# Patient Record
Sex: MALE | Race: BLACK OR AFRICAN AMERICAN | NOT HISPANIC OR LATINO | Employment: OTHER | URBAN - METROPOLITAN AREA
[De-identification: names, ages, dates, MRNs, and addresses within clinical notes are randomized per-mention and may not be internally consistent; named-entity substitution may affect disease eponyms.]

---

## 2017-07-07 ENCOUNTER — TELEPHONE (OUTPATIENT)
Dept: MEDICAL GROUP | Age: 67
End: 2017-07-07

## 2017-07-07 NOTE — TELEPHONE ENCOUNTER
Called Vladimir Sullivan and left a message to return my call regarding his/her upcoming NP appointment with Nelly Kitchen M.D..   If patient returns the call please transfer them to extension: [phone]

## 2017-07-10 NOTE — TELEPHONE ENCOUNTER
Called Vladimir Sullivan and left a message to return my call regarding his/her upcoming NP appointment with Nelly Kitchen M.D..   If patient returns the call please transfer them to extension: [phone]

## 2017-07-11 NOTE — TELEPHONE ENCOUNTER
NEW PATIENT VISIT PRE-VISIT PLANNING    1.  [employer] Patient is checked in Patient Demographics? YES    2.  Immunizations were updated in Epic using WebIZ?: No WebIZ record       •  Web Iz Recommendations:     3.  Is this appointment scheduled as a Hospital Follow-Up? No    4.  Patient is due for the following Health Maintenance Topics:   Health Maintenance Due   Topic Date Due   • IMM DTaP/Tdap/Td Vaccine (1 - Tdap) 04/08/1969   • COLONOSCOPY  04/08/2000   • IMM ZOSTER VACCINE  04/08/2010   • IMM PNEUMOCOCCAL 65+ (ADULT) LOW/MEDIUM RISK SERIES (1 of 2 - PCV13) 04/08/2015           5.  Reviewed/Updated the following with patient:       •   Preferred Pharmacy? YES       •   Preferred Lab? YES       •   Medications? YES. Was Abstract Encounter opened and chart updated? YES       •   Social History? YES. Was Abstract Encounter opened and chart updated? YES       •   Family History? YES. Was Abstract Encounter opened and chart updated? YES    6.  Updated Care Team?       •   DME Company (gait device, O2, CPAP, etc.) N\A       •   Other Specialists (eye doctor, derm, GYN, cardiology, endo, etc): N\A    7.  Patient was informed to arrive 15 min prior to their scheduled appointment and bring in their medication bottles.

## 2017-07-14 ENCOUNTER — OFFICE VISIT (OUTPATIENT)
Dept: MEDICAL GROUP | Age: 67
End: 2017-07-14
Payer: MEDICARE

## 2017-07-14 ENCOUNTER — HOSPITAL ENCOUNTER (OUTPATIENT)
Dept: LAB | Facility: MEDICAL CENTER | Age: 67
End: 2017-07-14
Attending: FAMILY MEDICINE
Payer: MEDICARE

## 2017-07-14 VITALS
TEMPERATURE: 98 F | RESPIRATION RATE: 12 BRPM | OXYGEN SATURATION: 95 % | BODY MASS INDEX: 24.47 KG/M2 | SYSTOLIC BLOOD PRESSURE: 142 MMHG | DIASTOLIC BLOOD PRESSURE: 80 MMHG | HEART RATE: 86 BPM | WEIGHT: 174.8 LBS | HEIGHT: 71 IN

## 2017-07-14 DIAGNOSIS — E55.9 VITAMIN D INSUFFICIENCY: ICD-10-CM

## 2017-07-14 DIAGNOSIS — I10 ESSENTIAL HYPERTENSION: ICD-10-CM

## 2017-07-14 DIAGNOSIS — E78.5 HYPERLIPIDEMIA, UNSPECIFIED HYPERLIPIDEMIA TYPE: ICD-10-CM

## 2017-07-14 DIAGNOSIS — Z12.11 COLON CANCER SCREENING: ICD-10-CM

## 2017-07-14 LAB
25(OH)D3 SERPL-MCNC: 19 NG/ML (ref 30–100)
ALBUMIN SERPL BCP-MCNC: 4.4 G/DL (ref 3.2–4.9)
ALBUMIN/GLOB SERPL: 1.6 G/DL
ALP SERPL-CCNC: 56 U/L (ref 30–99)
ALT SERPL-CCNC: 15 U/L (ref 2–50)
ANION GAP SERPL CALC-SCNC: 6 MMOL/L (ref 0–11.9)
AST SERPL-CCNC: 18 U/L (ref 12–45)
BASOPHILS # BLD AUTO: 0.8 % (ref 0–1.8)
BASOPHILS # BLD: 0.05 K/UL (ref 0–0.12)
BILIRUB SERPL-MCNC: 1.6 MG/DL (ref 0.1–1.5)
BUN SERPL-MCNC: 12 MG/DL (ref 8–22)
CALCIUM SERPL-MCNC: 9.6 MG/DL (ref 8.5–10.5)
CHLORIDE SERPL-SCNC: 110 MMOL/L (ref 96–112)
CHOLEST SERPL-MCNC: 143 MG/DL (ref 100–199)
CO2 SERPL-SCNC: 23 MMOL/L (ref 20–33)
CREAT SERPL-MCNC: 1.04 MG/DL (ref 0.5–1.4)
CREAT UR-MCNC: 167.2 MG/DL
EOSINOPHIL # BLD AUTO: 0.04 K/UL (ref 0–0.51)
EOSINOPHIL NFR BLD: 0.6 % (ref 0–6.9)
ERYTHROCYTE [DISTWIDTH] IN BLOOD BY AUTOMATED COUNT: 52.7 FL (ref 35.9–50)
GFR SERPL CREATININE-BSD FRML MDRD: >60 ML/MIN/1.73 M 2
GLOBULIN SER CALC-MCNC: 2.7 G/DL (ref 1.9–3.5)
GLUCOSE SERPL-MCNC: 83 MG/DL (ref 65–99)
HCT VFR BLD AUTO: 42.2 % (ref 42–52)
HDLC SERPL-MCNC: 68 MG/DL
HGB BLD-MCNC: 13.9 G/DL (ref 14–18)
IMM GRANULOCYTES # BLD AUTO: 0.01 K/UL (ref 0–0.11)
IMM GRANULOCYTES NFR BLD AUTO: 0.2 % (ref 0–0.9)
LDLC SERPL CALC-MCNC: 68 MG/DL
LYMPHOCYTES # BLD AUTO: 1.33 K/UL (ref 1–4.8)
LYMPHOCYTES NFR BLD: 20.6 % (ref 22–41)
MCH RBC QN AUTO: 31 PG (ref 27–33)
MCHC RBC AUTO-ENTMCNC: 32.9 G/DL (ref 33.7–35.3)
MCV RBC AUTO: 94 FL (ref 81.4–97.8)
MICROALBUMIN UR-MCNC: 1.3 MG/DL
MICROALBUMIN/CREAT UR: 8 MG/G (ref 0–30)
MONOCYTES # BLD AUTO: 0.58 K/UL (ref 0–0.85)
MONOCYTES NFR BLD AUTO: 9 % (ref 0–13.4)
NEUTROPHILS # BLD AUTO: 4.46 K/UL (ref 1.82–7.42)
NEUTROPHILS NFR BLD: 68.8 % (ref 44–72)
NRBC # BLD AUTO: 0 K/UL
NRBC BLD AUTO-RTO: 0 /100 WBC
PLATELET # BLD AUTO: 242 K/UL (ref 164–446)
PMV BLD AUTO: 11.3 FL (ref 9–12.9)
POTASSIUM SERPL-SCNC: 3.8 MMOL/L (ref 3.6–5.5)
PROT SERPL-MCNC: 7.1 G/DL (ref 6–8.2)
RBC # BLD AUTO: 4.49 M/UL (ref 4.7–6.1)
SODIUM SERPL-SCNC: 139 MMOL/L (ref 135–145)
TRIGL SERPL-MCNC: 35 MG/DL (ref 0–149)
WBC # BLD AUTO: 6.5 K/UL (ref 4.8–10.8)

## 2017-07-14 PROCEDURE — 80061 LIPID PANEL: CPT

## 2017-07-14 PROCEDURE — 99204 OFFICE O/P NEW MOD 45 MIN: CPT | Performed by: FAMILY MEDICINE

## 2017-07-14 PROCEDURE — 85025 COMPLETE CBC W/AUTO DIFF WBC: CPT

## 2017-07-14 PROCEDURE — 82043 UR ALBUMIN QUANTITATIVE: CPT

## 2017-07-14 PROCEDURE — 36415 COLL VENOUS BLD VENIPUNCTURE: CPT

## 2017-07-14 PROCEDURE — 82570 ASSAY OF URINE CREATININE: CPT

## 2017-07-14 PROCEDURE — 80053 COMPREHEN METABOLIC PANEL: CPT

## 2017-07-14 PROCEDURE — 82306 VITAMIN D 25 HYDROXY: CPT

## 2017-07-14 ASSESSMENT — PATIENT HEALTH QUESTIONNAIRE - PHQ9: CLINICAL INTERPRETATION OF PHQ2 SCORE: 0

## 2017-07-14 NOTE — MR AVS SNAPSHOT
"Vladimir Sullivan   2017 10:20 AM   Office Visit   MRN: [id number]    Department:  [facility]   Dept Phone:  [phone]    Description:  Male : 1950   Provider:  Nelly Kitchen M.D.           Reason for Visit     Patient Education ear pulsing, he states he was in the heat for 4 hours and wants to get checked up      Allergies as of 2017     No Known Allergies      You were diagnosed with     Essential hypertension   [7465427]       Vitamin D insufficiency   [437838]       Hyperlipidemia, unspecified hyperlipidemia type   [0612966]       Colon cancer screening   [684301]         Vital Signs     Blood Pressure Pulse Temperature Respirations Height Weight    142/80 mmHg 86 36.7 °C (98 °F) 12 1.803 m (5' 11\") 79.289 kg (174 lb 12.8 oz)    Body Mass Index Oxygen Saturation Smoking Status             24.39 kg/m2 95% Never Smoker          Basic Information     Date Of Birth Sex Race Ethnicity Preferred Language    1950 Male Black or  Non- English      Problem List              ICD-10-CM Priority Class Noted - Resolved    Essential hypertension I10   2017 - Present      Health Maintenance        Date Due Completion Dates    IMM DTaP/Tdap/Td Vaccine (1 - Tdap) 1969 ---    COLONOSCOPY 2000 ---    IMM ZOSTER VACCINE 2010 ---    IMM PNEUMOCOCCAL 65+ (ADULT) LOW/MEDIUM RISK SERIES (1 of 2 - PCV13) 2015 ---    IMM INFLUENZA (1) 2017 ---            Current Immunizations     No immunizations on file.      Below and/or attached are the medications your provider expects you to take. Review all of your home medications and newly ordered medications with your provider and/or pharmacist. Follow medication instructions as directed by your provider and/or pharmacist. Please keep your medication list with you and share with your provider. Update the information when medications are discontinued, doses are changed, or new medications (including over-the-counter " products) are added; and carry medication information at all times in the event of emergency situations     Allergies:  No Known Allergies          Medications  Valid as of: July 14, 2017 - 10:54 AM    Generic Name Brand Name Tablet Size Instructions for use    .                 Medicines prescribed today were sent to:     None      Medication refill instructions:       If your prescription bottle indicates you have medication refills left, it is not necessary to call your provider’s office. Please contact your pharmacy and they will refill your medication.    If your prescription bottle indicates you do not have any refills left, you may request refills at any time through one of the following ways: The online [facility] system (except Urgent Care), by calling your provider’s office, or by asking your pharmacy to contact your provider’s office with a refill request. Medication refills are processed only during regular business hours and may not be available until the next business day. Your provider may request additional information or to have a follow-up visit with you prior to refilling your medication.   *Please Note: Medication refills are assigned a new Rx number when refilled electronically. Your pharmacy may indicate that no refills were authorized even though a new prescription for the same medication is available at the pharmacy. Please request the medicine by name with the pharmacy before contacting your provider for a refill.        Your To Do List     Future Labs/Procedures Complete By Expires    CBC WITH DIFFERENTIAL  As directed 7/15/2018    COMP METABOLIC PANEL  As directed 7/15/2018    LIPID PROFILE  As directed 7/15/2018    MICROALBUMIN CREAT RATIO URINE  As directed 7/15/2018    OCCULT BLOOD FECES IMMUNOASSAY  As directed 7/14/2018    VITAMIN D,25 HYDROXY  As directed 7/15/2018         [facility] Status: Patient Declined

## 2017-07-16 PROBLEM — E55.9 VITAMIN D INSUFFICIENCY: Status: ACTIVE | Noted: 2017-07-16

## 2017-07-16 PROBLEM — E78.5 HYPERLIPIDEMIA: Status: ACTIVE | Noted: 2017-07-16

## 2017-07-16 NOTE — PROGRESS NOTES
CC: establish care, HTN    HPI:     Vladimir Sullivan is a 67 y.o. male, new patient to the clinic, presents to [facility]. Pt has the following concerns:     Pt has no major medical or surgical history. He does not take any medication.   He denies hx of tobacco, alcohol, or drug abuse.   He has hx of elevated BP and cholesterol in the past, however, he is managing both condition with diet and exercise.   He has hx of vitamin D insufficiency, but not taking supplement.   He otherwise feeling well. He does not have any specific concerns.   Denies chest pain, SOB, PALOMO, unusual edema, hematochezia, melena, abdominal pain.     Current medicines (including changes today)  No current outpatient prescriptions on file.     No current facility-administered medications for this visit.     He  has a past medical history of Heat exhaustion.  He  has past surgical history that includes laminotomy () and appendectomy.  Social History   Substance Use Topics   • Smoking status: Never Smoker    • Smokeless tobacco: Never Used   • Alcohol Use: No     Social History     Social History Narrative     Family History   Problem Relation Age of Onset   • Diabetes Mother    • Kidney Disease Mother    • Cancer Father      stomach caner   • Alcohol/Drug Brother    • Alcohol/Drug Brother    • No Known Problems Maternal Grandmother    • No Known Problems Maternal Grandfather    • No Known Problems Paternal Grandmother    • No Known Problems Paternal Grandfather      Family Status   Relation Status Death Age   • Mother  83   • Father  77   • Brother     • Brother     • Sister Alive    • Maternal Grandmother     • Maternal Grandfather     • Paternal Grandmother     • Paternal Grandfather     • Sister Alive    • Brother     • Sister  61   • Sister         I personally reviewed patient's problem list, allergies, medications, family hx, social hx with patient  "and update EPIC.     REVIEW OF SYSTEMS:  CONSTITUTIONAL:  Denies night sweats, fatigue, malaise, lethargy, fever or chills.  RESPIRATORY:  Denies cough, wheeze, hemoptysis, or shortness of breath.  CARDIOVASCULAR:  Denies chest pains, palpitations, pedal edema  GASTROINTESTINAL:  Denies abdominal pain, nausea or vomiting, diarrhea, constipation, hematemesis, hematochezia, melena.    All other systems reviewed and are negative     Objective:     Blood pressure 142/80, pulse 86, temperature 36.7 °C (98 °F), resp. rate 12, height 1.803 m (5' 11\"), weight 79.289 kg (174 lb 12.8 oz), SpO2 95 %. Body mass index is 24.39 kg/(m^2).  Physical Exam:    Constitutional: Awake, alert, in no apparent distress.  Skin: Warm, dry, good turgor, no rashes/jaundice in visible areas.  Eye: intact EOM, conjunctiva clear, lids normal.  Neck: Trachea midline, no masses, no thyromegaly. No cervical or supraclavicular lymphadenopathy.  Respiratory: Unlabored respiratory effort, lungs clear to auscultation, no wheezes, no rhonchi.  Cardiovascular: Normal S1, S2, no murmur, no rubs, no gallops, no pedal edema.  Abdomen: Soft, active bowel sounds, non-tender to palpation, no masses, no hepatosplenomegaly.  Neuro: CN 2-12 grossly intact, no focal weakness/numbness.   Psych: Alert and oriented x3, affect and mood wnl, intact judgement and insight.       Assessment and Plan:   The following treatment plan was discussed    1. Essential hypertension  Chronic, diet controlled, /80. Plan:   - continue dietary modification and regular exercise.   - COMP METABOLIC PANEL; Future  - CBC WITH DIFFERENTIAL; Future  - MICROALBUMIN CREAT RATIO URINE; Future    2. Vitamin D insufficiency  - VITAMIN D,25 HYDROXY; Future    3. Hyperlipidemia, unspecified hyperlipidemia type  - LIPID PROFILE; Future    4. Colon cancer screening  - OCCULT BLOOD FECES IMMUNOASSAY; Future      Records requested.  Followup: Return in about 1 year (around 7/14/2018) for Annual " wellness visit.    Please note that this dictation was created using voice recognition software. I have made every reasonable attempt to correct obvious errors, but I expect that there are errors of grammar and possibly content that I did not discover before finalizing the note.

## 2017-07-17 DIAGNOSIS — E55.9 VITAMIN D DEFICIENCY: ICD-10-CM

## 2017-07-18 ENCOUNTER — TELEPHONE (OUTPATIENT)
Dept: MEDICAL GROUP | Age: 67
End: 2017-07-18

## 2017-07-18 NOTE — Clinical Note
July 19, 2017        Vladimir Sullivan  [address]  [place] [address]        Dear Vladimir:    Dr. morris's office has been unable to reach you. Please call our office at [phone]. Thank you.      If you have any questions or concerns, please don't hesitate to call.        Sincerely,        Enrico Garza Ass't      Electronically Signed

## 2017-09-08 ENCOUNTER — TELEPHONE (OUTPATIENT)
Dept: MEDICAL GROUP | Age: 67
End: 2017-09-08

## 2017-09-09 NOTE — TELEPHONE ENCOUNTER
1. Caller Name: Vladimir Sullivan                                         Call Back Number:  [phone]      Patient approves a detailed voicemail message: N\A     Patient called asking for a copy of the [facility] paperwork Dr. Kitchen filled out for his disability plates.  I am unable to locate it at this time called him back with an update.  Will begin looking again next week.

## 2018-09-12 ENCOUNTER — HOSPITAL ENCOUNTER (OUTPATIENT)
Dept: LAB | Facility: MEDICAL CENTER | Age: 68
End: 2018-09-12
Attending: INTERNAL MEDICINE
Payer: MEDICARE

## 2018-09-12 ENCOUNTER — OFFICE VISIT (OUTPATIENT)
Dept: MEDICAL GROUP | Age: 68
End: 2018-09-12
Payer: MEDICARE

## 2018-09-12 VITALS
BODY MASS INDEX: 25.48 KG/M2 | HEART RATE: 74 BPM | WEIGHT: 182 LBS | TEMPERATURE: 98.6 F | DIASTOLIC BLOOD PRESSURE: 78 MMHG | SYSTOLIC BLOOD PRESSURE: 138 MMHG | HEIGHT: 71 IN | OXYGEN SATURATION: 96 %

## 2018-09-12 DIAGNOSIS — R60.0 PERIORBITAL EDEMA: ICD-10-CM

## 2018-09-12 DIAGNOSIS — Z12.11 SCREENING FOR COLORECTAL CANCER: ICD-10-CM

## 2018-09-12 DIAGNOSIS — E78.2 MIXED HYPERLIPIDEMIA: ICD-10-CM

## 2018-09-12 DIAGNOSIS — I10 ESSENTIAL HYPERTENSION: ICD-10-CM

## 2018-09-12 DIAGNOSIS — Z12.12 SCREENING FOR COLORECTAL CANCER: ICD-10-CM

## 2018-09-12 LAB
ALBUMIN SERPL BCP-MCNC: 4.7 G/DL (ref 3.2–4.9)
ALBUMIN/GLOB SERPL: 1.6 G/DL
ALP SERPL-CCNC: 68 U/L (ref 30–99)
ALT SERPL-CCNC: 16 U/L (ref 2–50)
ANION GAP SERPL CALC-SCNC: 9 MMOL/L (ref 0–11.9)
AST SERPL-CCNC: 22 U/L (ref 12–45)
BASOPHILS # BLD AUTO: 1 % (ref 0–1.8)
BASOPHILS # BLD: 0.08 K/UL (ref 0–0.12)
BILIRUB SERPL-MCNC: 1.3 MG/DL (ref 0.1–1.5)
BUN SERPL-MCNC: 14 MG/DL (ref 8–22)
CALCIUM SERPL-MCNC: 9.6 MG/DL (ref 8.5–10.5)
CHLORIDE SERPL-SCNC: 110 MMOL/L (ref 96–112)
CHOLEST SERPL-MCNC: 168 MG/DL (ref 100–199)
CO2 SERPL-SCNC: 22 MMOL/L (ref 20–33)
CREAT SERPL-MCNC: 1.13 MG/DL (ref 0.5–1.4)
EOSINOPHIL # BLD AUTO: 0.14 K/UL (ref 0–0.51)
EOSINOPHIL NFR BLD: 1.8 % (ref 0–6.9)
ERYTHROCYTE [DISTWIDTH] IN BLOOD BY AUTOMATED COUNT: 45.1 FL (ref 35.9–50)
ERYTHROCYTE [SEDIMENTATION RATE] IN BLOOD BY WESTERGREN METHOD: 3 MM/HOUR (ref 0–20)
FASTING STATUS PATIENT QL REPORTED: NORMAL
GLOBULIN SER CALC-MCNC: 3 G/DL (ref 1.9–3.5)
GLUCOSE SERPL-MCNC: 84 MG/DL (ref 65–99)
HCT VFR BLD AUTO: 45.5 % (ref 42–52)
HDLC SERPL-MCNC: 60 MG/DL
HGB BLD-MCNC: 15.7 G/DL (ref 14–18)
IMM GRANULOCYTES # BLD AUTO: 0.02 K/UL (ref 0–0.11)
IMM GRANULOCYTES NFR BLD AUTO: 0.3 % (ref 0–0.9)
LDLC SERPL CALC-MCNC: 100 MG/DL
LYMPHOCYTES # BLD AUTO: 2.09 K/UL (ref 1–4.8)
LYMPHOCYTES NFR BLD: 26.3 % (ref 22–41)
MCH RBC QN AUTO: 31.1 PG (ref 27–33)
MCHC RBC AUTO-ENTMCNC: 34.5 G/DL (ref 33.7–35.3)
MCV RBC AUTO: 90.1 FL (ref 81.4–97.8)
MONOCYTES # BLD AUTO: 0.65 K/UL (ref 0–0.85)
MONOCYTES NFR BLD AUTO: 8.2 % (ref 0–13.4)
NEUTROPHILS # BLD AUTO: 4.98 K/UL (ref 1.82–7.42)
NEUTROPHILS NFR BLD: 62.4 % (ref 44–72)
NRBC # BLD AUTO: 0 K/UL
NRBC BLD-RTO: 0 /100 WBC
PLATELET # BLD AUTO: 246 K/UL (ref 164–446)
PMV BLD AUTO: 10.8 FL (ref 9–12.9)
POTASSIUM SERPL-SCNC: 3.8 MMOL/L (ref 3.6–5.5)
PROT SERPL-MCNC: 7.7 G/DL (ref 6–8.2)
RBC # BLD AUTO: 5.05 M/UL (ref 4.7–6.1)
SODIUM SERPL-SCNC: 141 MMOL/L (ref 135–145)
TRIGL SERPL-MCNC: 39 MG/DL (ref 0–149)
TSH SERPL DL<=0.005 MIU/L-ACNC: 1.47 UIU/ML (ref 0.38–5.33)
WBC # BLD AUTO: 8 K/UL (ref 4.8–10.8)

## 2018-09-12 PROCEDURE — 80061 LIPID PANEL: CPT

## 2018-09-12 PROCEDURE — 84443 ASSAY THYROID STIM HORMONE: CPT

## 2018-09-12 PROCEDURE — 80053 COMPREHEN METABOLIC PANEL: CPT

## 2018-09-12 PROCEDURE — 85025 COMPLETE CBC W/AUTO DIFF WBC: CPT

## 2018-09-12 PROCEDURE — 85652 RBC SED RATE AUTOMATED: CPT

## 2018-09-12 PROCEDURE — 99203 OFFICE O/P NEW LOW 30 MIN: CPT | Performed by: INTERNAL MEDICINE

## 2018-09-12 PROCEDURE — 36415 COLL VENOUS BLD VENIPUNCTURE: CPT

## 2018-09-12 RX ORDER — METHYLPREDNISOLONE 4 MG/1
TABLET ORAL
Qty: 1 KIT | Refills: 0 | Status: SHIPPED | OUTPATIENT
Start: 2018-09-12

## 2018-09-12 RX ORDER — TRIAMCINOLONE ACETONIDE 40 MG/ML
40 INJECTION, SUSPENSION INTRA-ARTICULAR; INTRAMUSCULAR ONCE
Status: COMPLETED | OUTPATIENT
Start: 2018-09-12 | End: 2018-09-12

## 2018-09-12 RX ORDER — METHYLPREDNISOLONE 4 MG/1
TABLET ORAL
Qty: 1 KIT | Refills: 0 | Status: SHIPPED | OUTPATIENT
Start: 2018-09-12 | End: 2018-09-12 | Stop reason: SDUPTHER

## 2018-09-12 RX ADMIN — TRIAMCINOLONE ACETONIDE 40 MG: 40 INJECTION, SUSPENSION INTRA-ARTICULAR; INTRAMUSCULAR at 14:11

## 2018-09-12 ASSESSMENT — ENCOUNTER SYMPTOMS
PSYCHIATRIC NEGATIVE: 1
EYES NEGATIVE: 1
MUSCULOSKELETAL NEGATIVE: 1
GASTROINTESTINAL NEGATIVE: 1
CARDIOVASCULAR NEGATIVE: 1
RESPIRATORY NEGATIVE: 1
CONSTITUTIONAL NEGATIVE: 1
NEUROLOGICAL NEGATIVE: 1

## 2018-09-12 ASSESSMENT — PATIENT HEALTH QUESTIONNAIRE - PHQ9: CLINICAL INTERPRETATION OF PHQ2 SCORE: 0

## 2018-09-13 NOTE — PROGRESS NOTES
"Subjective:      Vladimir Sullivan is a 68 y.o. male who presents with Eye Problem (x 1 month )  This is a new patient to me unable see PCP today.  He is here for evaluation of intermittent recurrent periorbital edema for the last 1 month.   Denies any recent new medication.  Has not been applying any ice lotion or substances to the periorbital areas.  There is no pain in those areas.  He got some relief with Benadryl in terms of the swelling going down and there is some mild itching which also accompanies the swelling.  There is no eye drainage..  No conjunctival injection or pinkeye.  There is no sinus discharge sinus pain or sore throat.  No fever chills.  No recent travel.  No prior history of this.    He is on no new medications.    No outpatient prescriptions prior to visit.     No facility-administered medications prior to visit.      Patient has no known allergies.          Eye Problem          Review of Systems   Constitutional: Negative.    HENT: Negative.    Eyes: Negative.    Respiratory: Negative.    Cardiovascular: Negative.    Gastrointestinal: Negative.    Genitourinary: Negative.    Musculoskeletal: Negative.    Skin: Negative.    Neurological: Negative.    Endo/Heme/Allergies: Negative.    Psychiatric/Behavioral: Negative.           Objective:     /78   Pulse 74   Temp 37 °C (98.6 °F)   Ht 1.791 m (5' 10.5\")   Wt 82.6 kg (182 lb)   SpO2 96%   BMI 25.75 kg/m²      Physical Exam   Constitutional: He is oriented to person, place, and time. He appears well-developed and well-nourished. No distress.   HENT:   Head: Normocephalic and atraumatic.   Right Ear: External ear normal.   Left Ear: External ear normal.   Nose: Nose normal.   Mouth/Throat: Oropharynx is clear and moist. No oropharyngeal exudate.   Eyes: Pupils are equal, round, and reactive to light. Conjunctivae and EOM are normal. Right eye exhibits no discharge. Left eye exhibits no discharge. No scleral icterus.   Neck: Normal range " of motion. Neck supple. No JVD present. No tracheal deviation present. No thyromegaly present.   Cardiovascular: Normal rate, regular rhythm, normal heart sounds and intact distal pulses.  Exam reveals no gallop and no friction rub.    No murmur heard.  Pulmonary/Chest: Effort normal and breath sounds normal. No stridor. No respiratory distress. He has no wheezes. He has no rales. He exhibits no tenderness.   Abdominal: Soft. Bowel sounds are normal. He exhibits no distension and no mass. There is no tenderness. There is no rebound and no guarding.   Musculoskeletal: Normal range of motion. He exhibits no edema or tenderness.   Lymphadenopathy:     He has no cervical adenopathy.   Neurological: He is alert and oriented to person, place, and time. He has normal reflexes. He displays normal reflexes. He exhibits normal muscle tone. Coordination normal.   Skin: Skin is warm and dry. No rash noted. He is not diaphoretic. There is erythema. No pallor.   There is markedly periorbital edema bilaterally with erythema in the same area.  However no periorbital tenderness.  And no sinus tenderness.  No conjunctival discharge.  There is no desquamation or rash of the periorbital tissues.   Psychiatric: He has a normal mood and affect. His behavior is normal. Judgment and thought content normal.     No visits with results within 1 Month(s) from this visit.   Latest known visit with results is:   Hospital Outpatient Visit on 07/14/2017   Component Date Value   • Sodium 07/14/2017 139    • Potassium 07/14/2017 3.8    • Chloride 07/14/2017 110    • Co2 07/14/2017 23    • Anion Gap 07/14/2017 6.0    • Glucose 07/14/2017 83    • Bun 07/14/2017 12    • Creatinine 07/14/2017 1.04    • Calcium 07/14/2017 9.6    • AST(SGOT) 07/14/2017 18    • ALT(SGPT) 07/14/2017 15    • Alkaline Phosphatase 07/14/2017 56    • Total Bilirubin 07/14/2017 1.6*   • Albumin 07/14/2017 4.4    • Total Protein 07/14/2017 7.1    • Globulin 07/14/2017 2.7    •  A-G Ratio 07/14/2017 1.6    • WBC 07/14/2017 6.5    • RBC 07/14/2017 4.49*   • Hemoglobin 07/14/2017 13.9*   • Hematocrit 07/14/2017 42.2    • MCV 07/14/2017 94.0    • MCH 07/14/2017 31.0    • MCHC 07/14/2017 32.9*   • RDW 07/14/2017 52.7*   • Platelet Count 07/14/2017 242    • MPV 07/14/2017 11.3    • Neutrophils-Polys 07/14/2017 68.80    • Lymphocytes 07/14/2017 20.60*   • Monocytes 07/14/2017 9.00    • Eosinophils 07/14/2017 0.60    • Basophils 07/14/2017 0.80    • Immature Granulocytes 07/14/2017 0.20    • Nucleated RBC 07/14/2017 0.00    • Neutrophils (Absolute) 07/14/2017 4.46    • Lymphs (Absolute) 07/14/2017 1.33    • Monos (Absolute) 07/14/2017 0.58    • Eos (Absolute) 07/14/2017 0.04    • Baso (Absolute) 07/14/2017 0.05    • Immature Granulocytes (a* 07/14/2017 0.01    • NRBC (Absolute) 07/14/2017 0.00    • Cholesterol,Tot 07/14/2017 143    • Triglycerides 07/14/2017 35    • HDL 07/14/2017 68    • LDL 07/14/2017 68    • 25-Hydroxy   Vitamin D 25 07/14/2017 19*   • Creatinine, Urine 07/14/2017 167.20    • Microalbumin, Urine Rand* 07/14/2017 1.3    • Micro Alb Creat Ratio 07/14/2017 8    • GFR If  07/14/2017 >60    • GFR If Non  Ameri* 07/14/2017 >60          No results found for: HBA1C  Lab Results   Component Value Date/Time    SODIUM 139 07/14/2017 11:06 AM    POTASSIUM 3.8 07/14/2017 11:06 AM    CHLORIDE 110 07/14/2017 11:06 AM    CO2 23 07/14/2017 11:06 AM    GLUCOSE 83 07/14/2017 11:06 AM    BUN 12 07/14/2017 11:06 AM    CREATININE 1.04 07/14/2017 11:06 AM    ALKPHOSPHAT 56 07/14/2017 11:06 AM    ASTSGOT 18 07/14/2017 11:06 AM    ALTSGPT 15 07/14/2017 11:06 AM    TBILIRUBIN 1.6 (H) 07/14/2017 11:06 AM     No results found for: INR  Lab Results   Component Value Date/Time    CHOLSTRLTOT 143 07/14/2017 11:06 AM    LDL 68 07/14/2017 11:06 AM    HDL 68 07/14/2017 11:06 AM    TRIGLYCERIDE 35 07/14/2017 11:06 AM       No results found for: TESTOSTERONE  No results found for: TSH  No  results found for: FREET4  No results found for: URICACID  No components found for: VITB12  Lab Results   Component Value Date/Time    25HYDROXY 19 (L) 07/14/2017 11:06 AM               Assessment/Plan:     1. Periorbital edema-unclear etiology.  Probably allergic reaction to some unknown substance.  Rule out underlying metabolic disorder.  Check labs.  Trial of Kenalog injection and Medrol Dosepak.  Follow-up in 1-2 weeks with PCP to reassess and review labs.     - triamcinolone acetonide (KENALOG-40) injection 40 mg; 1 mL by Intramuscular route Once.  - MethylPREDNISolone (MEDROL DOSEPAK) 4 MG Tablet Therapy Pack; Medrol dosepack as directed  Dispense: 1 Kit; Refill: 0  - TSH; Future  - COMP METABOLIC PANEL; Future  - CBC WITH DIFFERENTIAL; Future  - WESTERGREN SED RATE; Future    2. Essential hypertension  Borderline.  DASH diet.  Follow-up in 1-2 weeks with PCP.  Check labs.  No medications for now.    3. Mixed hyperlipidemia     - LIPID PROFILE; Future    4. Screening for colorectal cancer        - REFERRAL TO GI FOR COLONOSCOPY      45 minute face-to-face encounter took place today.  More than half of this time was spent in the coordination of care of the above problems, as well as counseling.

## 2018-12-25 NOTE — TELEPHONE ENCOUNTER
----- Message from Nelly Kitchen M.D. sent at 7/17/2017  8:05 AM PDT -----  Please call pt & inform the following:   The test results show that kidney, liver, electrolytes, cholesterol are normal.  There is minor abnormalities on your blood counts, but not concerning.   Your vitamin D level is very low. I recommend taking vitamin D supplement.   If you would like to start vitamin D, please let me know and I'm happy to send Rx to your pharmacy.   MA: please update pharmacy reference.   Thanks.   Nelly Kitchen M.D.    
Phone Number Called: [phone] (home)     Message: Left message for the patient to call us back regarding the note below. 2nd attempt. Letter mailed to the pt.    Left Message for patient to call back: yes        
Phone Number Called: [phone] (home)     Message: Tried to call patient, got a busy signal twice. Will try again later.    Left Message for patient to call back: N\A        
Statement Selected